# Patient Record
Sex: MALE | NOT HISPANIC OR LATINO | URBAN - METROPOLITAN AREA
[De-identification: names, ages, dates, MRNs, and addresses within clinical notes are randomized per-mention and may not be internally consistent; named-entity substitution may affect disease eponyms.]

---
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
## 2022-04-08 ASSESSMENT — VISUAL ACUITY
OS_SC: 20/20-2
OS_SC: 20/20
OS_SC: 20/20
OS_CC: J1+-2
OD_SC: 20/20
OD_CC: J1+
OD_SC: 20/25+1
OS_CC: J1+
OD_SC: 20/40

## 2022-04-08 ASSESSMENT — TONOMETRY
OD_IOP_MMHG: 16
OD_IOP_MMHG: 15
OS_IOP_MMHG: 16
OS_IOP_MMHG: 15